# Patient Record
Sex: MALE | Race: WHITE | NOT HISPANIC OR LATINO | Employment: OTHER | URBAN - METROPOLITAN AREA
[De-identification: names, ages, dates, MRNs, and addresses within clinical notes are randomized per-mention and may not be internally consistent; named-entity substitution may affect disease eponyms.]

---

## 2020-01-26 ENCOUNTER — OFFICE VISIT (OUTPATIENT)
Dept: URGENT CARE | Facility: MEDICAL CENTER | Age: 55
End: 2020-01-26
Payer: COMMERCIAL

## 2020-01-26 VITALS
RESPIRATION RATE: 17 BRPM | OXYGEN SATURATION: 95 % | TEMPERATURE: 99.6 F | SYSTOLIC BLOOD PRESSURE: 120 MMHG | BODY MASS INDEX: 31.99 KG/M2 | HEART RATE: 104 BPM | HEIGHT: 69 IN | DIASTOLIC BLOOD PRESSURE: 62 MMHG | WEIGHT: 216 LBS

## 2020-01-26 DIAGNOSIS — J02.9 SORE THROAT: ICD-10-CM

## 2020-01-26 DIAGNOSIS — J02.0 STREP SORE THROAT: ICD-10-CM

## 2020-01-26 LAB
INT CON NEG: NORMAL
INT CON POS: NORMAL
S PYO AG THROAT QL: POSITIVE

## 2020-01-26 PROCEDURE — 87880 STREP A ASSAY W/OPTIC: CPT | Performed by: NURSE PRACTITIONER

## 2020-01-26 PROCEDURE — 99203 OFFICE O/P NEW LOW 30 MIN: CPT | Performed by: NURSE PRACTITIONER

## 2020-01-26 RX ORDER — PREDNISONE 20 MG/1
TABLET ORAL
Qty: 6 TAB | Refills: 0 | Status: SHIPPED | OUTPATIENT
Start: 2020-01-26 | End: 2021-11-03

## 2020-01-26 RX ORDER — AMOXICILLIN 500 MG/1
500 CAPSULE ORAL 2 TIMES DAILY
Qty: 20 CAP | Refills: 0 | Status: SHIPPED | OUTPATIENT
Start: 2020-01-26 | End: 2020-02-05

## 2020-01-26 ASSESSMENT — ENCOUNTER SYMPTOMS
CHILLS: 1
COUGH: 0
SORE THROAT: 1
SWOLLEN GLANDS: 1
NECK PAIN: 1
HEADACHES: 1
VOMITING: 0
ABDOMINAL PAIN: 0
EYE REDNESS: 0
MYALGIAS: 1
FEVER: 1
TROUBLE SWALLOWING: 1

## 2020-01-26 NOTE — PROGRESS NOTES
Subjective:      Tiago Ruiz Jr. is a 54 y.o. male who presents with Pharyngitis (x4days, sore throat)            Pharyngitis    This is a new problem. The current episode started in the past 7 days. The problem has been unchanged. Neither side of throat is experiencing more pain than the other. The maximum temperature recorded prior to his arrival was 100.4 - 100.9 F. The pain is at a severity of 7/10. The pain is moderate. Associated symptoms include ear pain, headaches, neck pain, swollen glands and trouble swallowing. Pertinent negatives include no abdominal pain, congestion, coughing, drooling or vomiting. He has tried acetaminophen for the symptoms. The treatment provided no relief.   Patient has no known allergies.  Current Outpatient Medications on File Prior to Visit   Medication Sig Dispense Refill   • tramadol (ULTRAM) 50 MG Tab Take 1-2 Tabs by mouth every 8 hours as needed. (Patient not taking: Reported on 1/26/2020) 20 Tab 0     No current facility-administered medications on file prior to visit.      Social History     Socioeconomic History   • Marital status:      Spouse name: Not on file   • Number of children: Not on file   • Years of education: Not on file   • Highest education level: Not on file   Occupational History   • Not on file   Social Needs   • Financial resource strain: Not on file   • Food insecurity:     Worry: Not on file     Inability: Not on file   • Transportation needs:     Medical: Not on file     Non-medical: Not on file   Tobacco Use   • Smoking status: Never Smoker   • Smokeless tobacco: Never Used   Substance and Sexual Activity   • Alcohol use: Yes     Comment: occas   • Drug use: No   • Sexual activity: Not on file   Lifestyle   • Physical activity:     Days per week: Not on file     Minutes per session: Not on file   • Stress: Not on file   Relationships   • Social connections:     Talks on phone: Not on file     Gets together: Not on file     Attends  "Gnosticism service: Not on file     Active member of club or organization: Not on file     Attends meetings of clubs or organizations: Not on file     Relationship status: Not on file   • Intimate partner violence:     Fear of current or ex partner: Not on file     Emotionally abused: Not on file     Physically abused: Not on file     Forced sexual activity: Not on file   Other Topics Concern   • Not on file   Social History Narrative   • Not on file     Breast Cancer-related family history is not on file.      Review of Systems   Constitutional: Positive for chills, fever and malaise/fatigue.   HENT: Positive for ear pain, sore throat and trouble swallowing. Negative for congestion and drooling.    Eyes: Negative for redness.   Respiratory: Negative for cough.    Gastrointestinal: Negative for abdominal pain and vomiting.   Musculoskeletal: Positive for myalgias and neck pain.   Skin: Negative for rash.   Neurological: Positive for headaches.          Objective:     /62   Pulse (!) 104   Temp 37.6 °C (99.6 °F) (Temporal)   Resp 17   Ht 1.753 m (5' 9\")   Wt 98 kg (216 lb)   SpO2 95%   BMI 31.90 kg/m²      Physical Exam  Vitals signs and nursing note reviewed.   Constitutional:       General: He is not in acute distress.     Appearance: He is well-developed.   HENT:      Head: Normocephalic.      Right Ear: External ear normal.      Left Ear: External ear normal.      Nose: Mucosal edema and rhinorrhea present.      Mouth/Throat:      Mouth: Mucous membranes are moist.      Pharynx: Pharyngeal swelling and posterior oropharyngeal erythema present.   Eyes:      General:         Right eye: No discharge.         Left eye: No discharge.      Conjunctiva/sclera: Conjunctivae normal.   Neck:      Musculoskeletal: Normal range of motion and neck supple.   Cardiovascular:      Rate and Rhythm: Normal rate and regular rhythm.      Heart sounds: Normal heart sounds.   Musculoskeletal: Normal range of motion. "   Lymphadenopathy:      Cervical: No cervical adenopathy.   Skin:     General: Skin is warm and dry.   Neurological:      Mental Status: He is alert and oriented to person, place, and time.   Psychiatric:         Behavior: Behavior normal.         Thought Content: Thought content normal.                 Assessment/Plan:       1. Strep sore throat  amoxicillin (AMOXIL) 500 MG Cap    predniSONE (DELTASONE) 20 MG Tab   2. Sore throat  POCT Rapid Strep A     Strep positive  Amoxicillin  3 day course of prednisone for swelling.  Tylenol for pain.  Fluids.  Differential diagnosis, natural history, supportive care, and indications for immediate follow-up discussed at length.

## 2021-03-26 ENCOUNTER — APPOINTMENT (RX ONLY)
Dept: URBAN - METROPOLITAN AREA CLINIC 4 | Facility: CLINIC | Age: 56
Setting detail: DERMATOLOGY
End: 2021-03-26

## 2021-03-26 DIAGNOSIS — L81.4 OTHER MELANIN HYPERPIGMENTATION: ICD-10-CM

## 2021-03-26 DIAGNOSIS — I83.9 ASYMPTOMATIC VARICOSE VEINS OF LOWER EXTREMITIES: ICD-10-CM

## 2021-03-26 DIAGNOSIS — D485 NEOPLASM OF UNCERTAIN BEHAVIOR OF SKIN: ICD-10-CM

## 2021-03-26 DIAGNOSIS — L23.0 ALLERGIC CONTACT DERMATITIS DUE TO METALS: ICD-10-CM

## 2021-03-26 DIAGNOSIS — Z71.89 OTHER SPECIFIED COUNSELING: ICD-10-CM

## 2021-03-26 DIAGNOSIS — L57.0 ACTINIC KERATOSIS: ICD-10-CM

## 2021-03-26 PROBLEM — D48.5 NEOPLASM OF UNCERTAIN BEHAVIOR OF SKIN: Status: ACTIVE | Noted: 2021-03-26

## 2021-03-26 PROBLEM — I83.92 ASYMPTOMATIC VARICOSE VEINS OF LEFT LOWER EXTREMITY: Status: ACTIVE | Noted: 2021-03-26

## 2021-03-26 PROCEDURE — ? ADDITIONAL NOTES

## 2021-03-26 PROCEDURE — 17003 DESTRUCT PREMALG LES 2-14: CPT

## 2021-03-26 PROCEDURE — ? COUNSELING

## 2021-03-26 PROCEDURE — ? SUNSCREEN TREATMENT REGIMEN

## 2021-03-26 PROCEDURE — ? PRESCRIPTION MEDICATION MANAGEMENT

## 2021-03-26 PROCEDURE — 99203 OFFICE O/P NEW LOW 30 MIN: CPT | Mod: 25

## 2021-03-26 PROCEDURE — ? PHOTO-DOCUMENTATION

## 2021-03-26 PROCEDURE — ? DIAGNOSIS COMMENT

## 2021-03-26 PROCEDURE — ? LIQUID NITROGEN

## 2021-03-26 PROCEDURE — ? REFERRAL

## 2021-03-26 PROCEDURE — ? PRESCRIPTION

## 2021-03-26 PROCEDURE — 17000 DESTRUCT PREMALG LESION: CPT

## 2021-03-26 RX ORDER — CICLOPIROX OLAMINE 7.7 MG/G
1 CREAM TOPICAL BID
Qty: 1 | Refills: 0 | Status: ERX | COMMUNITY
Start: 2021-03-26

## 2021-03-26 RX ORDER — CLOBETASOL PROPIONATE 0.5 MG/G
1 CREAM TOPICAL BID
Qty: 1 | Refills: 3 | Status: ERX | COMMUNITY
Start: 2021-03-26

## 2021-03-26 RX ADMIN — CICLOPIROX OLAMINE 1: 7.7 CREAM TOPICAL at 00:00

## 2021-03-26 RX ADMIN — CLOBETASOL PROPIONATE 1: 0.5 CREAM TOPICAL at 00:00

## 2021-03-26 ASSESSMENT — LOCATION ZONE DERM
LOCATION ZONE: ARM
LOCATION ZONE: FACE
LOCATION ZONE: MUCOUS_MEMBRANE
LOCATION ZONE: HAND
LOCATION ZONE: EYELID

## 2021-03-26 ASSESSMENT — LOCATION SIMPLE DESCRIPTION DERM
LOCATION SIMPLE: LEFT LATERAL TONGUE
LOCATION SIMPLE: RIGHT SUPERIOR EYELID
LOCATION SIMPLE: RIGHT HAND
LOCATION SIMPLE: LEFT WRIST
LOCATION SIMPLE: LEFT TEMPLE
LOCATION SIMPLE: RIGHT FOREARM
LOCATION SIMPLE: RIGHT CHEEK
LOCATION SIMPLE: LEFT ZYGOMA
LOCATION SIMPLE: RIGHT ZYGOMA
LOCATION SIMPLE: INFERIOR FOREHEAD
LOCATION SIMPLE: LEFT CHEEK
LOCATION SIMPLE: LEFT FOREARM
LOCATION SIMPLE: LEFT HAND

## 2021-03-26 ASSESSMENT — LOCATION DETAILED DESCRIPTION DERM
LOCATION DETAILED: LEFT MEDIAL ZYGOMA
LOCATION DETAILED: LEFT CENTRAL MALAR CHEEK
LOCATION DETAILED: RIGHT MEDIAL ZYGOMA
LOCATION DETAILED: INFERIOR MID FOREHEAD
LOCATION DETAILED: LEFT ULNAR DORSAL HAND
LOCATION DETAILED: RIGHT MEDIAL SUPERIOR EYELID
LOCATION DETAILED: LEFT SUPERIOR CENTRAL MALAR CHEEK
LOCATION DETAILED: LEFT PROXIMAL DORSAL FOREARM
LOCATION DETAILED: LEFT MEDIAL TEMPLE
LOCATION DETAILED: RIGHT RADIAL DORSAL HAND
LOCATION DETAILED: LEFT DORSAL WRIST
LOCATION DETAILED: RIGHT CENTRAL MALAR CHEEK
LOCATION DETAILED: RIGHT SUPERIOR CENTRAL MALAR CHEEK
LOCATION DETAILED: RIGHT PROXIMAL DORSAL FOREARM
LOCATION DETAILED: LEFT LATERAL TONGUE

## 2021-03-26 NOTE — PROCEDURE: ADDITIONAL NOTES
Render Risk Assessment In Note?: no
Additional Notes: slight scaling on single leading edge, 1 pustule; will treat w loprox x 1-2 weeks in case of tinea, switch to topical steroid if no improvement
Detail Level: Simple

## 2021-03-26 NOTE — PROCEDURE: PHOTO-DOCUMENTATION
Photo Preface (Leave Blank If You Do Not Want): Photographs were obtained today for clinical monitoring
Detail Level: Detailed

## 2021-03-26 NOTE — PROCEDURE: LIQUID NITROGEN
Detail Level: Detailed
Render Post-Care Instructions In Note?: no
Consent: The patient's consent was obtained including but not limited to risks of crusting, scabbing, blistering, scarring, darker or lighter pigmentary change, recurrence, incomplete removal and infection.
Duration Of Freeze Thaw-Cycle (Seconds): 5
Number Of Freeze-Thaw Cycles: 2 freeze-thaw cycles
Post-Care Instructions: I reviewed with the patient in detail post-care instructions. Patient is to wear sunprotection, and avoid picking at any of the treated lesions. Pt may apply Vaseline to crusted or scabbing areas.

## 2021-03-26 NOTE — PROCEDURE: COUNSELING
Detail Level: Zone
Detail Level: Detailed
Patient Specific Counseling (Will Not Stick From Patient To Patient): Instructed to RTC asap if darkens, grows or any other concerning changes

## 2021-05-14 ENCOUNTER — APPOINTMENT (RX ONLY)
Dept: URBAN - METROPOLITAN AREA CLINIC 4 | Facility: CLINIC | Age: 56
Setting detail: DERMATOLOGY
End: 2021-05-14

## 2021-05-14 DIAGNOSIS — L57.0 ACTINIC KERATOSIS: ICD-10-CM

## 2021-05-14 PROBLEM — D48.5 NEOPLASM OF UNCERTAIN BEHAVIOR OF SKIN: Status: ACTIVE | Noted: 2021-05-14

## 2021-05-14 PROBLEM — D23.111 OTHER BENIGN NEOPLASM OF SKIN OF RIGHT UPPER EYELID, INCLUDING CANTHUS: Status: ACTIVE | Noted: 2021-05-14

## 2021-05-14 PROCEDURE — ? ADDITIONAL NOTES

## 2021-05-14 PROCEDURE — 99212 OFFICE O/P EST SF 10 MIN: CPT | Mod: 25

## 2021-05-14 PROCEDURE — 17000 DESTRUCT PREMALG LESION: CPT

## 2021-05-14 PROCEDURE — ? COUNSELING

## 2021-05-14 PROCEDURE — ? OBSERVATION

## 2021-05-14 PROCEDURE — ? LIQUID NITROGEN

## 2021-05-14 ASSESSMENT — LOCATION SIMPLE DESCRIPTION DERM: LOCATION SIMPLE: LEFT FOREARM

## 2021-05-14 ASSESSMENT — LOCATION DETAILED DESCRIPTION DERM
LOCATION DETAILED: LEFT DISTAL RADIAL DORSAL FOREARM
LOCATION DETAILED: LEFT DISTAL DORSAL FOREARM

## 2021-05-14 ASSESSMENT — LOCATION ZONE DERM: LOCATION ZONE: ARM

## 2021-05-14 NOTE — PROCEDURE: LIQUID NITROGEN
Render Note In Bullet Format When Appropriate: No
Number Of Freeze-Thaw Cycles: 2 freeze-thaw cycles
Duration Of Freeze Thaw-Cycle (Seconds): 5
Detail Level: Detailed
Post-Care Instructions: I reviewed with the patient in detail post-care instructions. Patient is to wear sunprotection, and avoid picking at any of the treated lesions. Pt may apply Vaseline to crusted or scabbing areas.
Consent: The patient's consent was obtained including but not limited to risks of crusting, scabbing, blistering, scarring, darker or lighter pigmentary change, recurrence, incomplete removal and infection.

## 2021-05-14 NOTE — PROCEDURE: ADDITIONAL NOTES
Render Risk Assessment In Note?: no
Additional Notes: Recommended biopsy today to r/o alternative dx, denied, will clinically monitor; Instructed to RTC asap if any growth, changes prior to scheduled appt
Detail Level: Simple

## 2021-07-16 ENCOUNTER — APPOINTMENT (RX ONLY)
Dept: URBAN - METROPOLITAN AREA CLINIC 4 | Facility: CLINIC | Age: 56
Setting detail: DERMATOLOGY
End: 2021-07-16

## 2021-07-16 DIAGNOSIS — L98.8 OTHER SPECIFIED DISORDERS OF THE SKIN AND SUBCUTANEOUS TISSUE: ICD-10-CM | Status: UNCHANGED

## 2021-07-16 DIAGNOSIS — L82.1 OTHER SEBORRHEIC KERATOSIS: ICD-10-CM

## 2021-07-16 PROBLEM — D23.111 OTHER BENIGN NEOPLASM OF SKIN OF RIGHT UPPER EYELID, INCLUDING CANTHUS: Status: ACTIVE | Noted: 2021-07-16

## 2021-07-16 PROCEDURE — 99212 OFFICE O/P EST SF 10 MIN: CPT

## 2021-07-16 PROCEDURE — ? OBSERVATION

## 2021-07-16 PROCEDURE — ? COUNSELING

## 2021-07-16 PROCEDURE — ? ADDITIONAL NOTES

## 2021-07-16 ASSESSMENT — LOCATION DETAILED DESCRIPTION DERM
LOCATION DETAILED: LEFT VENTRAL TONGUE
LOCATION DETAILED: LEFT PROXIMAL DORSAL FOREARM
LOCATION DETAILED: RIGHT PROXIMAL DORSAL FOREARM

## 2021-07-16 ASSESSMENT — LOCATION SIMPLE DESCRIPTION DERM
LOCATION SIMPLE: LEFT FOREARM
LOCATION SIMPLE: VENTRAL TONGUE
LOCATION SIMPLE: RIGHT FOREARM

## 2021-07-16 ASSESSMENT — LOCATION ZONE DERM
LOCATION ZONE: MUCOUS_MEMBRANE
LOCATION ZONE: ARM

## 2021-11-01 ENCOUNTER — PRE-ADMISSION TESTING (OUTPATIENT)
Dept: ADMISSIONS | Facility: MEDICAL CENTER | Age: 56
End: 2021-11-01
Attending: GENERAL PRACTICE
Payer: COMMERCIAL

## 2021-11-01 DIAGNOSIS — Z01.811 PRE-OPERATIVE RESPIRATORY EXAMINATION: ICD-10-CM

## 2021-11-01 LAB — COVID ORDER STATUS COVID19: NORMAL

## 2021-11-01 PROCEDURE — U0005 INFEC AGEN DETEC AMPLI PROBE: HCPCS

## 2021-11-01 PROCEDURE — U0003 INFECTIOUS AGENT DETECTION BY NUCLEIC ACID (DNA OR RNA); SEVERE ACUTE RESPIRATORY SYNDROME CORONAVIRUS 2 (SARS-COV-2) (CORONAVIRUS DISEASE [COVID-19]), AMPLIFIED PROBE TECHNIQUE, MAKING USE OF HIGH THROUGHPUT TECHNOLOGIES AS DESCRIBED BY CMS-2020-01-R: HCPCS

## 2021-11-02 LAB
SARS-COV-2 RNA RESP QL NAA+PROBE: NOTDETECTED
SPECIMEN SOURCE: NORMAL

## 2021-11-03 ENCOUNTER — ANESTHESIA (OUTPATIENT)
Dept: RADIOLOGY | Facility: MEDICAL CENTER | Age: 56
End: 2021-11-03
Payer: COMMERCIAL

## 2021-11-03 ENCOUNTER — ANESTHESIA EVENT (OUTPATIENT)
Dept: RADIOLOGY | Facility: MEDICAL CENTER | Age: 56
End: 2021-11-03
Payer: COMMERCIAL

## 2021-11-03 ENCOUNTER — HOSPITAL ENCOUNTER (OUTPATIENT)
Dept: RADIOLOGY | Facility: MEDICAL CENTER | Age: 56
End: 2021-11-03
Attending: GENERAL PRACTICE
Payer: COMMERCIAL

## 2021-11-03 VITALS
RESPIRATION RATE: 20 BRPM | HEART RATE: 83 BPM | HEIGHT: 69 IN | TEMPERATURE: 97.3 F | OXYGEN SATURATION: 99 % | BODY MASS INDEX: 33.31 KG/M2 | WEIGHT: 224.87 LBS

## 2021-11-03 DIAGNOSIS — R79.89 LFTS ABNORMAL: ICD-10-CM

## 2021-11-03 DIAGNOSIS — R05.9 COUGH: ICD-10-CM

## 2021-11-03 PROCEDURE — 700111 HCHG RX REV CODE 636 W/ 250 OVERRIDE (IP): Performed by: ANESTHESIOLOGY

## 2021-11-03 PROCEDURE — 700105 HCHG RX REV CODE 258: Performed by: ANESTHESIOLOGY

## 2021-11-03 PROCEDURE — 700111 HCHG RX REV CODE 636 W/ 250 OVERRIDE (IP)

## 2021-11-03 PROCEDURE — 70551 MRI BRAIN STEM W/O DYE: CPT

## 2021-11-03 RX ORDER — SODIUM CHLORIDE, SODIUM LACTATE, POTASSIUM CHLORIDE, CALCIUM CHLORIDE 600; 310; 30; 20 MG/100ML; MG/100ML; MG/100ML; MG/100ML
INJECTION, SOLUTION INTRAVENOUS
Status: DISCONTINUED | OUTPATIENT
Start: 2021-11-03 | End: 2021-11-03 | Stop reason: SURG

## 2021-11-03 RX ORDER — ACETAMINOPHEN 500 MG
500-1000 TABLET ORAL EVERY 6 HOURS PRN
COMMUNITY

## 2021-11-03 RX ORDER — DIPHENHYDRAMINE HYDROCHLORIDE 50 MG/ML
12.5 INJECTION INTRAMUSCULAR; INTRAVENOUS
Status: DISCONTINUED | OUTPATIENT
Start: 2021-11-03 | End: 2021-11-04 | Stop reason: HOSPADM

## 2021-11-03 RX ORDER — ONDANSETRON 2 MG/ML
4 INJECTION INTRAMUSCULAR; INTRAVENOUS
Status: DISCONTINUED | OUTPATIENT
Start: 2021-11-03 | End: 2021-11-04 | Stop reason: HOSPADM

## 2021-11-03 RX ORDER — HALOPERIDOL 5 MG/ML
1 INJECTION INTRAMUSCULAR
Status: DISCONTINUED | OUTPATIENT
Start: 2021-11-03 | End: 2021-11-04 | Stop reason: HOSPADM

## 2021-11-03 RX ORDER — MIDAZOLAM HYDROCHLORIDE 1 MG/ML
INJECTION INTRAMUSCULAR; INTRAVENOUS
Status: COMPLETED
Start: 2021-11-03 | End: 2021-11-03

## 2021-11-03 RX ORDER — SODIUM CHLORIDE, SODIUM LACTATE, POTASSIUM CHLORIDE, CALCIUM CHLORIDE 600; 310; 30; 20 MG/100ML; MG/100ML; MG/100ML; MG/100ML
INJECTION, SOLUTION INTRAVENOUS CONTINUOUS
Status: DISCONTINUED | OUTPATIENT
Start: 2021-11-03 | End: 2021-11-04 | Stop reason: HOSPADM

## 2021-11-03 RX ORDER — MIDAZOLAM HYDROCHLORIDE 1 MG/ML
2 INJECTION INTRAMUSCULAR; INTRAVENOUS ONCE
Status: COMPLETED | OUTPATIENT
Start: 2021-11-03 | End: 2021-11-03

## 2021-11-03 RX ADMIN — SODIUM CHLORIDE, POTASSIUM CHLORIDE, SODIUM LACTATE AND CALCIUM CHLORIDE: 600; 310; 30; 20 INJECTION, SOLUTION INTRAVENOUS at 09:25

## 2021-11-03 RX ADMIN — MIDAZOLAM HYDROCHLORIDE: 1 INJECTION, SOLUTION INTRAMUSCULAR; INTRAVENOUS at 10:15

## 2021-11-03 RX ADMIN — PROPOFOL 150 MG: 10 INJECTION, EMULSION INTRAVENOUS at 09:31

## 2021-11-03 RX ADMIN — MIDAZOLAM HYDROCHLORIDE: 1 INJECTION INTRAMUSCULAR; INTRAVENOUS at 10:15

## 2021-11-03 RX ADMIN — SODIUM CHLORIDE, POTASSIUM CHLORIDE, SODIUM LACTATE AND CALCIUM CHLORIDE: 600; 310; 30; 20 INJECTION, SOLUTION INTRAVENOUS at 09:31

## 2021-11-03 ASSESSMENT — PAIN SCALES - GENERAL: PAIN_LEVEL: 0

## 2021-11-03 NOTE — ANESTHESIA PREPROCEDURE EVALUATION
Relevant Problems   Other   (positive) Right lateral epicondylitis   (positive) Tennis elbow       Physical Exam    Airway   Mallampati: II  TM distance: >3 FB  Neck ROM: full       Cardiovascular - normal exam  Rhythm: regular  Rate: normal  (-) murmur     Dental - normal exam           Pulmonary - normal exam  Breath sounds clear to auscultation     Abdominal    Neurological - normal exam                 Anesthesia Plan    ASA 2       Plan - general       Airway plan will be LMA          Induction: intravenous    Postoperative Plan: Postoperative administration of opioids is intended.    Pertinent diagnostic labs and testing reviewed    Informed Consent:    Anesthetic plan and risks discussed with patient.    Use of blood products discussed with: patient whom consented to blood products.

## 2021-11-03 NOTE — ANESTHESIA TIME REPORT
Anesthesia Start and Stop Event Times     Date Time Event    11/3/2021 0908 Ready for Procedure     0927 Anesthesia Start     1001 Anesthesia Stop        Responsible Staff  11/03/21    Name Role Begin End    Curtis Nelson M.D. Anesth 0927 1001        Preop Diagnosis (Free Text):  Pre-op Diagnosis             Preop Diagnosis (Codes):    Premium Reason  Non-Premium    Comments:

## 2021-11-03 NOTE — PROGRESS NOTES
MRI NURSING NOTES:    Pt awoke c tachypnea, stating he cannot breathe.  97% on RA.  O2 restarted and mask placed on Pt's face - tolerating.  Dr Nelson called and assessed Pt.  VO order received for 2mg IVP Midazolam for panic attack - given to Pt now resting, sleeping. RR 24, 97%/4L, 145/77, 85.

## 2021-11-03 NOTE — DISCHARGE INSTRUCTIONS
MRI ADULT DISCHARGE INSTRUCTIONS    You have been medicated today for your scan. Please follow the instructions below to ensure your safe recovery. If you have any questions or problems, feel free to call us at [phone] or [phone].     1.   Have someone stay with you to assist you as needed.    2.   Do not drive or operate any mechanical devices.    3.   Do not perform any activity that requires concentration. Make no major decisions over the next 24 hours.     4.   Be careful changing positions from laying down to sitting or standing, as you may become dizzy.     5.   Do not drink alcohol for 48 hours.    6.   There are no restrictions for eating your normal meals. Drink fluids.    7.   You may continue your usual medications for pain, tranquilizers, muscle relaxants or sedatives when awake.     8.   Tomorrow, you may continue your normal daily activities.     9.   Pressure dressing on 10 - 15 minutes. If swelling or bleeding occurs when removed, continue placing direct pressure on injection site for another 5 minutes, or until bleeding stops.    Midazolam (VERSED)  What is this medicine?  You were given MIDAZOLAM (DIALLO wood) for your procedure today. This medication is a benzodiazepine. It is used to cause relaxation or sleep before surgery and to block the memory of the procedure.  This medicine may be used for other purposes; ask your health care provider or pharmacist if you have questions.  What side effects may I notice from receiving this medicine?  Side effects that you should report to your doctor or health care professional as soon as possible:  • allergic reactions like skin rash, itching or hives, swelling of the face, lips, or tongue  • breathing problems  • confusion  • dizziness or lightheadedness  • fast, irregular heartbeat  • halluninations during recovery  • numbness or tingling in the hands or feet  • pain, redness, or swelling at site where injected  • seizures  Side effects that usually  do not require medical attention (report to your doctor or health care professional if they continue or are bothersome):  • coughing  • headache  • hiccups  • involuntary eye and muscle movements  • loss of memory of events just before, during, and after use  • nausea, vomiting  • speech problems  • tiredness  • trouble sleeping or nightmares  This list may not describe all possible side effects. Call your doctor for medical advice about side effects. You may report side effects to FDA at 5-479-JVS-1125.           I have been informed of and understand the above discharge instructions.

## 2021-11-03 NOTE — ANESTHESIA PROCEDURE NOTES
Airway    Date/Time: 11/3/2021 9:31 AM  Performed by: Curtis Nelson M.D.  Authorized by: Curtis Nelson M.D.     Location:  OR  Urgency:  Elective  Indications for Airway Management:  Anesthesia      Spontaneous Ventilation: absent    Sedation Level:  Deep  Preoxygenated: Yes    Final Airway Type:  Supraglottic airway  Final Supraglottic Airway:  Standard LMA    SGA Size:  4  Number of Attempts at Approach:  1

## 2021-11-03 NOTE — ANESTHESIA POSTPROCEDURE EVALUATION
Patient: Tiago Ruiz Jr.    Procedure Summary     Date: 11/03/21 Room / Location: [facility] [address]    Anesthesia Start: 0927 Anesthesia Stop: 1001    Procedure: MR-BRAIN-W/O Diagnosis:       LFTs abnormal      Cough    Scheduled Providers:  Responsible Provider: Curtis Nelson M.D.    Anesthesia Type: general ASA Status: 2          Final Anesthesia Type: general  Last vitals  BP   NIBP: 129/61    Temp   36.3 °C (97.3 °F)    Pulse   76   Resp        SpO2   99 %      Anesthesia Post Evaluation    Patient location during evaluation: PACU  Patient participation: complete - patient participated  Level of consciousness: awake and alert  Pain score: 0    Airway patency: patent  Anesthetic complications: no  Cardiovascular status: hemodynamically stable  Respiratory status: acceptable  Hydration status: euvolemic    PONV: none          There were no known complications for this encounter.

## 2021-11-03 NOTE — PROGRESS NOTES
MRI NURSING NOTES:      Patient to MRI Outpatient Dept.  Patient informed process and plan of care during this visit.  Anesthesiologist, Dr Nelson spoke c Patient and discussed provider's plan of care.  Patient agreed.  MRI Brain s  contrast completed.  Patient awoke from anesthesia c panic attack.  MD assessed.  Midaz 2mg IVP given x1 per MD order.  Patient tolerated diet and activities once awake and appropriate.  DC instructions discussed. Questions encouraged.  Questions answered &/or deferred to provider.    Patient DC'd in stable condition c responsible adult, Arlet.

## 2022-02-22 ENCOUNTER — APPOINTMENT (RX ONLY)
Dept: URBAN - METROPOLITAN AREA CLINIC 6 | Facility: CLINIC | Age: 57
Setting detail: DERMATOLOGY
End: 2022-02-22

## 2022-02-22 DIAGNOSIS — L81.4 OTHER MELANIN HYPERPIGMENTATION: ICD-10-CM

## 2022-02-22 PROBLEM — D23.111 OTHER BENIGN NEOPLASM OF SKIN OF RIGHT UPPER EYELID, INCLUDING CANTHUS: Status: ACTIVE | Noted: 2022-02-22

## 2022-02-22 PROCEDURE — 99212 OFFICE O/P EST SF 10 MIN: CPT

## 2022-02-22 PROCEDURE — ? ADDITIONAL NOTES

## 2022-02-22 PROCEDURE — ? OBSERVATION

## 2022-02-22 PROCEDURE — ? COUNSELING

## 2022-02-22 ASSESSMENT — LOCATION ZONE DERM: LOCATION ZONE: NOSE

## 2022-02-22 ASSESSMENT — LOCATION DETAILED DESCRIPTION DERM: LOCATION DETAILED: RIGHT NASAL SIDEWALL

## 2022-02-22 ASSESSMENT — LOCATION SIMPLE DESCRIPTION DERM: LOCATION SIMPLE: RIGHT NOSE

## 2025-02-10 ENCOUNTER — APPOINTMENT (OUTPATIENT)
Dept: URBAN - METROPOLITAN AREA CLINIC 6 | Facility: CLINIC | Age: 60
Setting detail: DERMATOLOGY
End: 2025-02-10

## 2025-02-10 DIAGNOSIS — L57.0 ACTINIC KERATOSIS: ICD-10-CM

## 2025-02-10 PROBLEM — D23.111 OTHER BENIGN NEOPLASM OF SKIN OF RIGHT UPPER EYELID, INCLUDING CANTHUS: Status: ACTIVE | Noted: 2025-02-10

## 2025-02-10 PROCEDURE — 99212 OFFICE O/P EST SF 10 MIN: CPT | Mod: 25

## 2025-02-10 PROCEDURE — 17003 DESTRUCT PREMALG LES 2-14: CPT

## 2025-02-10 PROCEDURE — 17000 DESTRUCT PREMALG LESION: CPT

## 2025-02-10 PROCEDURE — ? COUNSELING

## 2025-02-10 PROCEDURE — ? ADDITIONAL NOTES

## 2025-02-10 PROCEDURE — ? LIQUID NITROGEN

## 2025-02-10 PROCEDURE — ? OBSERVATION

## 2025-02-10 ASSESSMENT — LOCATION SIMPLE DESCRIPTION DERM
LOCATION SIMPLE: LEFT FOREHEAD
LOCATION SIMPLE: RIGHT FOREHEAD
LOCATION SIMPLE: RIGHT CHEEK

## 2025-02-10 ASSESSMENT — LOCATION DETAILED DESCRIPTION DERM
LOCATION DETAILED: RIGHT CENTRAL MALAR CHEEK
LOCATION DETAILED: LEFT SUPERIOR FOREHEAD
LOCATION DETAILED: RIGHT LATERAL MALAR CHEEK
LOCATION DETAILED: RIGHT SUPERIOR FOREHEAD

## 2025-02-10 ASSESSMENT — LOCATION ZONE DERM: LOCATION ZONE: FACE

## 2025-02-10 NOTE — PROCEDURE: ADDITIONAL NOTES
Render Risk Assessment In Note?: no
Additional Notes: Minimal growth, still favoring hidrocystoma
Detail Level: Simple

## 2025-02-10 NOTE — PROCEDURE: LIQUID NITROGEN
Consent: The patient's consent was obtained including but not limited to risks of crusting, scabbing, blistering, scarring, darker or lighter pigmentary change, recurrence, incomplete removal and infection.
Render Post-Care Instructions In Note?: no
Show Aperture Variable?: Yes
Post-Care Instructions: I reviewed with the patient in detail post-care instructions. Patient is to wear sunprotection, and avoid picking at any of the treated lesions. Pt may apply Vaseline to crusted or scabbing areas.
Detail Level: Detailed
Number Of Freeze-Thaw Cycles: 2 freeze-thaw cycles
Duration Of Freeze Thaw-Cycle (Seconds): 10